# Patient Record
Sex: FEMALE | Race: ASIAN | Employment: UNEMPLOYED | URBAN - METROPOLITAN AREA
[De-identification: names, ages, dates, MRNs, and addresses within clinical notes are randomized per-mention and may not be internally consistent; named-entity substitution may affect disease eponyms.]

---

## 2024-01-25 ENCOUNTER — APPOINTMENT (OUTPATIENT)
Dept: ULTRASOUND IMAGING | Age: 29
End: 2024-01-25
Payer: MEDICAID

## 2024-01-25 ENCOUNTER — HOSPITAL ENCOUNTER (EMERGENCY)
Age: 29
Discharge: HOME | End: 2024-01-25
Payer: MEDICAID

## 2024-01-25 VITALS
WEIGHT: 110 LBS | HEIGHT: 63 IN | RESPIRATION RATE: 20 BRPM | OXYGEN SATURATION: 100 % | SYSTOLIC BLOOD PRESSURE: 105 MMHG | BODY MASS INDEX: 19.49 KG/M2 | DIASTOLIC BLOOD PRESSURE: 64 MMHG | TEMPERATURE: 99 F | HEART RATE: 100 BPM

## 2024-01-25 DIAGNOSIS — Z34.91 FIRST TRIMESTER PREGNANCY: ICD-10-CM

## 2024-01-25 DIAGNOSIS — U07.1 COVID-19 VIRUS INFECTION: Primary | ICD-10-CM

## 2024-01-25 LAB
BILIRUB UR QL STRIP.AUTO: NEGATIVE
CLARITY UR: CLEAR
COLOR UR: NORMAL
FLUAV RNA RESP QL NAA+PROBE: NOT DETECTED
FLUBV RNA RESP QL NAA+PROBE: NOT DETECTED
GLUCOSE UR STRIP.AUTO-MCNC: NEGATIVE MG/DL
HCG UR QL: POSITIVE
HGB UR QL STRIP.AUTO: NEGATIVE
KETONES UR STRIP.AUTO-MCNC: NEGATIVE MG/DL
LEUKOCYTE ESTERASE UR QL STRIP.AUTO: NEGATIVE
NITRITE UR QL STRIP.AUTO: NEGATIVE
PH UR STRIP.AUTO: 6 [PH] (ref 5–8)
PROT UR STRIP.AUTO-MCNC: NEGATIVE MG/DL
SARS-COV-2 RNA RESP QL NAA+PROBE: DETECTED
SP GR UR STRIP.AUTO: 1.01 (ref 1–1.03)
UA COMPLETE W REFLEX CULTURE PNL UR: NORMAL
UA DIPSTICK W REFLEX MICRO PNL UR: NORMAL
URN SPEC COLLECT METH UR: NORMAL
UROBILINOGEN UR STRIP-ACNC: 0.2 E.U./DL

## 2024-01-25 PROCEDURE — 76801 OB US < 14 WKS SINGLE FETUS: CPT

## 2024-01-25 PROCEDURE — 99284 EMERGENCY DEPT VISIT MOD MDM: CPT

## 2024-01-25 PROCEDURE — 81003 URINALYSIS AUTO W/O SCOPE: CPT

## 2024-01-25 PROCEDURE — 84703 CHORIONIC GONADOTROPIN ASSAY: CPT

## 2024-01-25 PROCEDURE — 87636 SARSCOV2 & INF A&B AMP PRB: CPT

## 2024-01-25 ASSESSMENT — PAIN SCALES - GENERAL: PAINLEVEL_OUTOF10: 0

## 2024-01-25 ASSESSMENT — PAIN - FUNCTIONAL ASSESSMENT: PAIN_FUNCTIONAL_ASSESSMENT: 0-10

## 2024-01-25 NOTE — ED PROVIDER NOTES
CARE TIME:     None        EMERGENCY DEPARTMENT COURSE and DIFFERENTIAL DIAGNOSIS/MDM:   Vitals:    Vitals:    01/25/24 1235 01/25/24 1623   BP: 112/74 105/64   Pulse: (!) 105 100   Resp: 20 20   Temp: 99 °F (37.2 °C)    SpO2: 99% 100%   Weight: 49.9 kg (110 lb)    Height: 1.6 m (5' 3\")        Patient was given the following medications:  None     CC/HPI Summary, DDx, ED course, and Reassessment: Patient here concerned about some cold/flu symptoms for 3 days and additionally she is concerned about her current pregnancy.  She describes some mild pelvic discomfort for 1 week.  No vaginal bleeding or discharge.  Differential diagnoses: COVID, flu, other viral syndrome, normal pregnancy, ectopic pregnancy  Rapid COVID and flu was done by nursing staff.  She tests positive for COVID-19.  Urinalysis normal  Urine hCG positive  Pelvic ultrasound shows a single viable intrauterine gestation with an estimated gestational age of 7 weeks and 1 day.    Patient was reassessed and she remains hemodynamically stable.  Upon using a is back  I let the patient know that she tested positive for COVID and that she is 7 weeks pregnant.  She can take Tylenol for any pain or fever.  She should follow-up with her OB next Wednesday as planned.    Exclusion criteria - the patient is NOT to be included for SEP-1 Core Measure due to:  Viral etiology found or highly suspected (including COVID-19) without concomitant bacterial infection     Consults/discussion with other professionals: None  Social determinants: None  Chronic conditions: None  Records reviewed: None    Disposition considerations/Plan: Patient here describing cold/flu symptoms for 3 days.  She ultimately tested positive for COVID.  She also reports finding out she was pregnant last month and she does not have an appointment with OB/GYN until next week.  She reports some pelvic discomfort and therefore ultrasound was done.  She has a viable 7-week pregnancy.  I made her

## 2024-01-25 NOTE — ED NOTES
Patient wanted her urine checked stating her urine is cloudy. Denies any burning or bleeding. Denies any abdominal pain or cramping at this time. Reports she sees her OBGYN next week. Denies any problems with pregnancy